# Patient Record
Sex: MALE | Race: WHITE | NOT HISPANIC OR LATINO
[De-identification: names, ages, dates, MRNs, and addresses within clinical notes are randomized per-mention and may not be internally consistent; named-entity substitution may affect disease eponyms.]

---

## 2024-01-02 PROBLEM — Z00.00 ENCOUNTER FOR PREVENTIVE HEALTH EXAMINATION: Status: ACTIVE | Noted: 2024-01-02

## 2024-03-25 ENCOUNTER — APPOINTMENT (OUTPATIENT)
Dept: PULMONOLOGY | Facility: CLINIC | Age: 64
End: 2024-03-25
Payer: MEDICARE

## 2024-03-25 VITALS
HEART RATE: 90 BPM | HEIGHT: 64 IN | BODY MASS INDEX: 21.34 KG/M2 | SYSTOLIC BLOOD PRESSURE: 138 MMHG | RESPIRATION RATE: 16 BRPM | WEIGHT: 125 LBS | DIASTOLIC BLOOD PRESSURE: 78 MMHG | OXYGEN SATURATION: 93 %

## 2024-03-25 PROCEDURE — 99205 OFFICE O/P NEW HI 60 MIN: CPT

## 2024-03-25 NOTE — END OF VISIT
[Time Spent: ___ minutes] : I have spent [unfilled] minutes of time on the encounter. [FreeTextEntry3] :   I, Dr. Hair personally performed the evaluation and management (E/M) services for this new patient. That E/M includes conducting the clinically appropriate initial history &/or exam, assessing all conditions, and establishing the plan of care.   Agree with pas note by Salima   1- endo valve pending assessment 2- financial assessment 3-derm eval for dermatitis forehead  4- pt unsure of how to proceed and needs distinct social suport

## 2024-03-25 NOTE — REVIEW OF SYSTEMS
[Fatigue] : fatigue [Cough] : cough [Dyspnea] : dyspnea [SOB on Exertion] : sob on exertion [Seasonal Allergies] : seasonal allergies [Chronic Pain] : chronic pain [Headache] : headache [Focal Weakness] : focal weakness [Anxiety] : anxiety [Panic Attacks] : panic attacks [Negative] : Genitourinary [TextBox_101] : folliculitis: facial  [TextBox_91] : s/p back surgery [TextBox_119] : left foot drop, RSD s/p back surgery

## 2024-03-25 NOTE — PHYSICAL EXAM
[No Acute Distress] : no acute distress [Normal Oropharynx] : normal oropharynx [Normal Appearance] : normal appearance [Normal Rate/Rhythm] : normal rate/rhythm [No Resp Distress] : no resp distress [No Abnormalities] : no abnormalities [No Clubbing] : no clubbing [Benign] : benign [No Edema] : no edema [No Focal Deficits] : no focal deficits [Oriented x3] : oriented x3 [TextBox_68] : BS diminished throughout [TextBox_99] : left leg weakness (RSD) partial foot drop L foot [TextBox_125] : open wounds at forhead, right temple of face, tip of nose "folliculitis"

## 2024-03-25 NOTE — REASON FOR VISIT
[Initial] : an initial visit [TextBox_44] : Evaluation for [place] valve versus initial Pre Lung Transplant evaluation  [TextBox_13] : Dr. Jose Gomez

## 2024-03-25 NOTE — ASSESSMENT
[FreeTextEntry1] : 64 y/o male COPD, severe emphysema here today for an evaluation for suitability for [place] valve versus initial pre lung transplant evaluation.  #[place] Valve Evaluation versus Lung Transplant Evaluation  -End stage COPD  -Need data: CT on disc for review, PFT's Cardiac Cath and ECHO results if available   Follow up:  -continue to follow up with primary pulmonologist for [place] valve referral  -folllow up with dermatologist for open wounds (folliculitis and skin check)  -refer to pain management   -send for financial clearance  -if financially cleared, bring to consent and education class for Pre Lung Transplant.   RTC: after evaluation for [place] Valve or in 2-3 months  I, Dr. Hair, personally performed the evaluation and management (E/M) services for this new patient. That E/M includes conducting the clinically appropriate initial history &/or exam, assessing all conditions, and establishing the plan of care. Today, CARLY, Salima Zavaleta, was here to observe my evaluation and management service for this patient & follow plan of care established by me going forward.

## 2024-03-25 NOTE — HISTORY OF PRESENT ILLNESS
[Former] : former [TextBox_4] : 62 y/o male COPD, severe emphysema here today for an evaluation for suitability for [place] valve versus initial pre lung transplant evaluation.  PMH: COPD, Herniated discs, Reflex Sympathetic Dystrophy 3/2023 >14 day hospitalization for PNA and bacteremia  When/how diagnosed with primary pulm dx?: SOB 2021 post pneumonia" for a long time".  PSH :Back surgery laminectomy at L4/5 1996, revised in 1997> pain managed with nerve blocks  Current pulmonary issues: unable to tolerate walking without SOB, "hungry for air" on oxygen 3L NC, needs 5L on exertion.  Oxygen Requirement:  3LNC at rest *5LNC on exertion 4LNC with sleep, uses BiPap machine with oxygen.  Working for income:   [ ] yes [X ] no disability since 1997.   Diabetic: [ ] yes [ X] no   Performs activities of daily living with SOME assistance.   Occupation:  stopped in 1996   Exposures: Diesel fumes, sanding, paint, rust, grinding metal   Quality of life: "my quality of life is poor"   Attends Pulmonary Rehab: No    Prior hospitalizations, ICU admission or intubations: denies intubation however had laminectomy 1996 & 1997, denies blood transfusions  Pulmonologist: Dr. Gomez [phone] PCP: Dr. Vogel [phone]  Hx covid infection, blood transfusions: + Covid-19 in 2020,    Health maintenance/vaccines: up to date   COVID vax: refuses   Family History: Mother had MS, Father +MI    Social History: Lives with: son in his home.  ETOH/tobacco use: stopped smoking 2 years ago/ denies ETOH  DATA REVIEWED: PFT/TITA: (not received)  FVC: FEV1: TLC: DLCO:  ABG's 6/12/23 pH :7.43, PCO2: 50, PO2:82, HCO3 31.0, O2 Sat 97.7%,   6MWT: (not received)  CT CHEST: (not received)  ECHO: (not received)   RHC/LHC:(not received)  Overview of Lung Transplant Process and pre-evaluation for lung transplant provided with Blue folder provided.   All questions answered, used teach back method, patient verbalized understanding.  Above discussed with attending physician Dr. Hair. [YearQuit] : 2022

## 2024-06-05 PROBLEM — J44.9 ADVANCED COPD: Status: ACTIVE | Noted: 2024-03-22

## 2024-06-05 PROBLEM — R09.02 HYPOXEMIA: Status: ACTIVE | Noted: 2024-03-22

## 2024-06-05 PROBLEM — R06.02 SOB (SHORTNESS OF BREATH) ON EXERTION: Status: ACTIVE | Noted: 2024-03-22

## 2024-06-06 ENCOUNTER — APPOINTMENT (OUTPATIENT)
Dept: THORACIC SURGERY | Facility: CLINIC | Age: 64
End: 2024-06-06
Payer: MEDICARE

## 2024-06-06 VITALS
BODY MASS INDEX: 21.34 KG/M2 | RESPIRATION RATE: 18 BRPM | HEART RATE: 105 BPM | TEMPERATURE: 98.1 F | SYSTOLIC BLOOD PRESSURE: 123 MMHG | OXYGEN SATURATION: 97 % | DIASTOLIC BLOOD PRESSURE: 79 MMHG | HEIGHT: 64 IN | WEIGHT: 125 LBS

## 2024-06-06 DIAGNOSIS — Z82.49 FAMILY HISTORY OF ISCHEMIC HEART DISEASE AND OTHER DISEASES OF THE CIRCULATORY SYSTEM: ICD-10-CM

## 2024-06-06 DIAGNOSIS — J44.9 CHRONIC OBSTRUCTIVE PULMONARY DISEASE, UNSPECIFIED: ICD-10-CM

## 2024-06-06 DIAGNOSIS — Z87.891 PERSONAL HISTORY OF NICOTINE DEPENDENCE: ICD-10-CM

## 2024-06-06 DIAGNOSIS — Z82.0 FAMILY HISTORY OF EPILEPSY AND OTHER DISEASES OF THE NERVOUS SYSTEM: ICD-10-CM

## 2024-06-06 DIAGNOSIS — J43.9 EMPHYSEMA, UNSPECIFIED: ICD-10-CM

## 2024-06-06 DIAGNOSIS — Z87.01 PERSONAL HISTORY OF PNEUMONIA (RECURRENT): ICD-10-CM

## 2024-06-06 DIAGNOSIS — Z80.1 FAMILY HISTORY OF MALIGNANT NEOPLASM OF TRACHEA, BRONCHUS AND LUNG: ICD-10-CM

## 2024-06-06 DIAGNOSIS — Z86.69 PERSONAL HISTORY OF OTHER DISEASES OF THE NERVOUS SYSTEM AND SENSE ORGANS: ICD-10-CM

## 2024-06-06 DIAGNOSIS — R06.02 SHORTNESS OF BREATH: ICD-10-CM

## 2024-06-06 DIAGNOSIS — R09.02 HYPOXEMIA: ICD-10-CM

## 2024-06-06 PROCEDURE — 99204 OFFICE O/P NEW MOD 45 MIN: CPT

## 2024-06-06 RX ORDER — PREDNISONE 10 MG/1
10 TABLET ORAL
Refills: 0 | Status: COMPLETED | COMMUNITY

## 2024-06-06 RX ORDER — ROFLUMILAST 250 UG/1
250 TABLET ORAL
Refills: 0 | Status: ACTIVE | COMMUNITY

## 2024-06-06 RX ORDER — BUDESONIDE AND FORMOTEROL FUMARATE DIHYDRATE 160; 4.5 UG/1; UG/1
160-4.5 AEROSOL RESPIRATORY (INHALATION)
Refills: 0 | Status: ACTIVE | COMMUNITY

## 2024-06-06 RX ORDER — ALBUTEROL SULFATE 90 UG/1
INHALANT RESPIRATORY (INHALATION)
Refills: 0 | Status: ACTIVE | COMMUNITY

## 2024-06-06 RX ORDER — TIOTROPIUM BROMIDE INHALATION SPRAY 1.56 UG/1
1.25 SPRAY, METERED RESPIRATORY (INHALATION)
Refills: 0 | Status: ACTIVE | COMMUNITY

## 2024-06-06 RX ORDER — IPRATROPIUM BROMIDE AND ALBUTEROL SULFATE 2.5; .5 MG/3ML; MG/3ML
0.5-2.5 (3) SOLUTION RESPIRATORY (INHALATION)
Refills: 0 | Status: ACTIVE | COMMUNITY

## 2024-06-06 RX ORDER — AZITHROMYCIN 250 MG/1
250 TABLET, FILM COATED ORAL
Refills: 0 | Status: ACTIVE | COMMUNITY

## 2024-06-06 RX ORDER — OXYCODONE HYDROCHLORIDE 5 MG/1
5 CAPSULE ORAL
Refills: 0 | Status: ACTIVE | COMMUNITY

## 2024-06-06 NOTE — ASSESSMENT
[FreeTextEntry1] : I had the pleasure of seeing Mr. DOWLING in the office today to discuss surgical options for advanced chronic obstructive pulmonary disease.  Briefly, this is a 64 year old male with a history of emphysema, oxygen dependence and pneumonia as well as RSD and chronic back pain who is referred to our office to discuss surgical options for his advanced chronic obstructive pulmonary disease.   He has been evaluated by the lung transplant team at [facility] at was felt to be a potential candidate, but he and his wife agreed that he is too weak and sick to proceed with the number of appointments and testing needed for transplant consideration, including the possibility of needing to rush to [place], which is far from where he lives.  We discussed the [place] valve and the idea behind its function. In the past, we might have resected parts of the lung most affected by the emphysema to optimize the utilization of healthier lung tissue. The valve is placed through the airway/windpipe and similarly excludes portions of the lung. The procedure was described as well as risks and benefits. There is some concern looking at his CT scan, that this procedure won't be as helpful for him given that he has more diffuse disease but further testing may be helpful to identify areas with greater ventilation/perfusion mismatch.  We will proceed with a Ventilation/Perfusion scan to further assess lung function. He should then return to the office to discuss the results and moving forward with the bronchial valve procedure.  I have fully reviewed and evaluated all available results. All questions were answered and concerns were addressed.   Plan: - V/Q scan - Return to care to discuss surgery after imaging - Patient is in full understanding and agreement with the plan going forward.   I, Dr. Selvin Grullon, personally performed the evaluation and management (E/M) services for this established patient who presents today with an existing condition.  That E/M includes conducting the examination, assessing all conditions, and establishing a new plan of care.  Today, Jed Alanis PA-C, was here to observe my evaluation and management services for this/these condition(s) to be followed going forward.

## 2024-06-06 NOTE — DATA REVIEWED
[FreeTextEntry1] :  CT-CHEST NON CONTRAST performed at [facility] on 2/26/24: HISTORY: J44.9 Chronic obstructive pulmonary disease TECHNIQUE: Axial images through the chest were obtained without intravenous contrast. Coronal and sagittal reformats were performed. Thin section high-resolution axial images were obtained. Axial MIP images were obtained on a separate workstation and available for interpretation. This study was performed using automatic exposure control and an iterative reconstruction technique (radiation dose reduction software) to obtain a diagnostic image quality scan with patient dose as low as reasonably achievable. The mA and kV were adjusted according to patient size. The administered radiation dose was .225mSv.  COMPARISON: Chest CT 5/15/2023  FINDINGS: THYROID: The visualized thyroid gland is unremarkable. CHEST WALL/SOFT TISSUES: Within normal limits LYMPH NODES: There are no pathologically enlarged axillary, supraclavicular, mediastinal or hilar lymph nodes. Slightly limited evaluation of the madie without intravenous contrast. HEART / VASCULAR STRUCTURES: The heart is not enlarged. There is no pericardial effusion. Coronary artery calcifications are present. The thoracic aorta is normal in contour and caliber, with mild to moderate atherosclerotic change. LUNGS: Marked emphysematous changes Stable subcentimeter bilateral calcified and noncalcified pulmonary nodules, best seen on MIP reformatted images New mild linear atelectasis/scar in the right middle lobe No new nodules or infiltrates Mild bilateral bronchial wall thickening with mild residual mucus plugging in the right lower lobe 4:209. Mild mucous plugging in the lingula 4:218. Probable retained mucoid material in the trachea and bronchus intermedius PLEURA: There is no pleural effusion. UPPER ABDOMEN: 2 mm nonobstructing mid to upper pole left renal parenchymal calculus  OSSEOUS STRUCTURES: No osseous destructive lesions.  IMPRESSION: Marked emphysematous changes Stable subcentimeter bilateral calcified and noncalcified pulmonary nodules New mild linear atelectasis/scar in the right middle lobe Mild bilateral bronchial wall thickening with mucus plugging as above Recommend 12 month follow-up CT the chest  Signed by: Amara Hyatt MD Signed Date: 2/29/2024 11:41 AM EST SIGNED BY: Amara Hyatt M.D., Ext. [phone] 02/29/2024 11:41 AM       CT-CHEST NON CONTRAST performed at [facility] on 5/15/23: HISTORY: J18.0 Bronchopneumonia TECHNIQUE: Axial images through the chest were obtained without intravenous contrast. Coronal and sagittal reformats were performed. Thin section high-resolution axial images were obtained. Axial MIP images were obtained on a separate workstation and available for interpretation. This study was performed using automatic exposure control and an iterative reconstruction technique (radiation dose reduction software) to obtain a diagnostic image quality scan with patient dose as low as reasonably achievable. The mA and kV were adjusted according to patient size. The administered radiation dose was 1.008mSv.  COMPARISON: Chest radiographs dated March 28, 2023  FINDINGS: LUNGS: There are marked emphysematous changes within both lungs.. There has been interval resolution of the previously noted right upper lobe opacity. There are increased reticular markings in the right upper lobe. No traction bronchiolectasis. No honeycombing. PULMONARY NODULES OR MASSES: On series 4, there are multiple sub-6 mm pulmonary nodules as follows: Right upper lobe: Image 93, 134, 144 Right middle lobe: None. Right lower lobe: Image 165, 226, 242 Left upper lobe: None. Left lower lobe: None. TRACHEA AND BRONCHI: There is mild diffuse peribronchial thickening bilaterally. HEART AND PERICARDIUM: Heart size is normal. No pericardial effusion.. There are mild coronary artery calcifications. AORTA: Normal caliber aorta. There are mild aortic atherosclerotic vascular calcifications. ADENOPATHY: None. LIMITED VIEWS OF THE ABDOMEN: There is a punctate nonobstructing stone within the mid left kidney. OSSEOUS STRUCTURES: No acute osseous abnormality. There are mild degenerative changes of the spine. There are old fractures of the left seventh and eighth ribs. OVERLYING SOFT TISSUES: Unremarkable. THYROID: The thyroid is unremarkable.  IMPRESSION:  1. No focal airspace disease to suggest pneumonia. Increased reticular markings in the right upper lobe without traction bronchiolectasis or honeycombing. These findings are indeterminate for usual interstitial pneumonia (UIP).. 2. Marked emphysematous changes within both lungs. 3. Mild peribronchial thickening. This may have an inflammatory or infectious etiology. 4. Multiple sub-6 mm pulmonary nodules. Per the Fleischner criteria, as the patient has a smoking history, follow-up CT chest is recommended in 12 months 5. Punctate nonobstructing left renal stone.  Signed by: Kun Fatima DO Signed Date: 5/16/2023 9:41 PM EDT SIGNED BY: Kun Fatima DO, Ext [phone] 05/16/2023 09:41 PM

## 2024-06-06 NOTE — HISTORY OF PRESENT ILLNESS
[FreeTextEntry1] : Mr. DREA DOWLING is a 64-year-old male, referred by Dr. Gomez, who presents for consultation regarding evaluation for suitability for [place] valve versus Lung Transplant Evaluation.   Past medical history includes COPD, severe emphysema, Herniated discs, Back surgery laminectomy at L4/5 1996, revised in 1997.  Today, he reports having been on the transplant list but he couldn't physically make all of the appointments. He has chronic back pain in addition to shortness of breath. He is dependent on the oxygen. He has shortness of breath on exertion, worse at the beginning of getting up and going. The air quality outside also impacts his breathing and he has a significant cough. He has been having significant allergies this year. He's also had pneumonia last year and two summers ago which resulted in sepsis.   The patient is accompanied by his wife, Celestina, who assists with history taking.

## 2024-06-06 NOTE — PHYSICAL EXAM
[General Appearance - Alert] : alert [Sclera] : the sclera and conjunctiva were normal [Outer Ear] : the ears and nose were normal in appearance [Neck Appearance] : the appearance of the neck was normal [Neck Cervical Mass (___cm)] : no neck mass was observed [Respiration, Rhythm And Depth] : normal respiratory rhythm and effort [Heart Rate And Rhythm] : heart rate was normal and rhythm regular [Heart Sounds] : normal S1 and S2 [2+] : left 2+ [Bowel Sounds] : normal bowel sounds [Abdomen Soft] : soft [Cervical Lymph Nodes Enlarged Posterior Bilaterally] : posterior cervical [Cervical Lymph Nodes Enlarged Anterior Bilaterally] : anterior cervical [Supraclavicular Lymph Nodes Enlarged Bilaterally] : supraclavicular [Abnormal Walk] : normal gait [Skin Color & Pigmentation] : normal skin color and pigmentation [Skin Turgor] : normal skin turgor [] : no rash [No Focal Deficits] : no focal deficits [Oriented To Time, Place, And Person] : oriented to person, place, and time [Impaired Insight] : insight and judgment were intact [Affect] : the affect was normal [Mood] : the mood was normal [FreeTextEntry1] : diminshed throughout [FreeTextEntry2] : no edema

## 2024-06-20 ENCOUNTER — APPOINTMENT (OUTPATIENT)
Dept: NUCLEAR MEDICINE | Facility: HOSPITAL | Age: 64
End: 2024-06-20